# Patient Record
Sex: FEMALE | Race: WHITE | NOT HISPANIC OR LATINO | Employment: FULL TIME | URBAN - METROPOLITAN AREA
[De-identification: names, ages, dates, MRNs, and addresses within clinical notes are randomized per-mention and may not be internally consistent; named-entity substitution may affect disease eponyms.]

---

## 2021-06-09 ENCOUNTER — OFFICE VISIT (OUTPATIENT)
Dept: ORTHOPEDIC SURGERY | Facility: CLINIC | Age: 66
End: 2021-06-09

## 2021-06-09 VITALS — HEIGHT: 64 IN | TEMPERATURE: 97.1 F | WEIGHT: 290 LBS | BODY MASS INDEX: 49.51 KG/M2

## 2021-06-09 DIAGNOSIS — M25.561 RIGHT KNEE PAIN, UNSPECIFIED CHRONICITY: Primary | ICD-10-CM

## 2021-06-09 PROCEDURE — 73562 X-RAY EXAM OF KNEE 3: CPT | Performed by: ORTHOPAEDIC SURGERY

## 2021-06-09 PROCEDURE — 99202 OFFICE O/P NEW SF 15 MIN: CPT | Performed by: ORTHOPAEDIC SURGERY

## 2021-06-09 RX ORDER — LISINOPRIL 40 MG/1
TABLET ORAL
COMMUNITY
Start: 2021-06-05

## 2021-06-09 RX ORDER — HYDROCHLOROTHIAZIDE 25 MG/1
TABLET ORAL
COMMUNITY
Start: 2021-06-05

## 2021-06-09 RX ORDER — METOPROLOL SUCCINATE 25 MG/1
TABLET, EXTENDED RELEASE ORAL
COMMUNITY
Start: 2021-06-05

## 2021-06-09 RX ORDER — ASPIRIN 81 MG/1
1 TABLET ORAL DAILY
COMMUNITY

## 2021-06-10 NOTE — PROGRESS NOTES
Patient: Francine Gilliam    YOB: 1955    Medical Record Number: [id number]    Chief Complaints: Right knee pain    History of Present Illness:     65 y.o. female patient who presents for her right knee.  About 4 weeks ago something popped in the front of her knee.  She tells me that the pain kept getting worse and worse.  He got to the point where she could hardly walk.  She went to urgent care and was told that she had arthritis in the knee.  She was given oral steroids.  The knee is now doing fine.  All of her pain is resolved.  She debated canceling this appointment.    Allergies:   Allergies   Allergen Reactions   • Sulfa Antibiotics Other (See Comments)       Home Medications:      Current Outpatient Medications:   •  aspirin (aspirin) 81 MG EC tablet, Take 1 tablet by mouth Daily., Disp: , Rfl:   •  hydroCHLOROthiazide (HYDRODIURIL) 25 MG tablet, , Disp: , Rfl:   •  lisinopril (PRINIVIL,ZESTRIL) 40 MG tablet, , Disp: , Rfl:   •  metFORMIN (GLUCOPHAGE) 500 MG tablet, Take 500 mg by mouth Daily., Disp: , Rfl:   •  metoprolol succinate XL (TOPROL-XL) 25 MG 24 hr tablet, , Disp: , Rfl:     History reviewed. No pertinent past medical history.    History reviewed. No pertinent surgical history.    Social History     Occupational History   • Not on file   Tobacco Use   • Smoking status: Not on file   Substance and Sexual Activity   • Alcohol use: Not on file   • Drug use: Not on file   • Sexual activity: Not on file      Social History     Social History Narrative   • Not on file       History reviewed. No pertinent family history.    Review of Systems:      Constitutional: Denies fever, shaking or chills   Eyes: Denies change in visual acuity   HEENT: Denies nasal congestion or sore throat   Respiratory: Denies cough or shortness of breath   Cardiovascular: Denies chest pain or edema  Endocrine: Denies tremors, palpitations, intolerance of heat or cold, polyuria, polydipsia.  GI: Denies abdominal  "pain, nausea, vomiting, bloody stools or diarrhea  : Denies frequency, urgency, incontinence, retention, or nocturia.  Musculoskeletal: Denies numbness, tingling or loss of motor function except as above  Integument: Denies rash, lesion or ulceration   Neurologic: Denies headache or focal weakness, deficits  Heme: Denies spontaneous or excessive bleeding, epistaxis, hematuria, melena, fatigue, enlarged or tender lymph nodes.      All other pertinent positives and negatives as noted above in HPI.    Physical Exam: 65 y.o. female    Vitals:    06/09/21 1530   Temp: 97.1 °F (36.2 °C)   Weight: 132 kg (290 lb)   Height: 162.6 cm (64\")       General:  Patient is awake and alert.  Appears in no acute distress or discomfort.    Psych:  Affect and demeanor are appropriate.    Eyes:  Conjunctiva and sclera appear grossly normal.  Eyes track well and EOM seem to be intact.    Ears:  No gross abnormalities.  Hearing adequate for the exam.    Cardiovascular:  Regular rate and rhythm.    Lungs:  Good chest expansion.  Breathing unlabored.    Lymph:  No palpable masses or adenopathy in the affected extremity    Extremities:  Right knee:  Skin is benign.  No gross abnormalities on inspection.  No palpable masses or adenopathy.  No effusion.  No significant joint line tenderness.  Full motion.  No instability.  Negative medial and lateral Cesia's test.  Good strength with hip flexion, knee extension, ankle and toe plantarflexion and dorsiflexion.  Sensation is intact distally.  Brisk capillary refill in the toes with good skin turgor.         Radiology:   AP, merchant's and lateral views of the right knee are ordered and reviewed to evaluate her complaint.  No comparison films are immediately available.  The merchant's view is poor but it appears to me that she does have patellofemoral arthritis with osteophyte formation and subchondral sclerosis.  The tibiofemoral articulation appears fairly well-preserved.  No acute " abnormalities, lesions, masses or other concerning findings.    Assessment/Plan: Right knee pain, resolved    It looks like she probably has some patellofemoral arthritis and this may have flared up.  Her exam today is totally benign.  I told her to call me if it recurs.  Otherwise, she can follow-up as needed.    Priyank Amaya MD    06/09/2021    CC to System, Provider Not In

## 2022-03-16 ENCOUNTER — OFFICE VISIT (OUTPATIENT)
Dept: ORTHOPEDIC SURGERY | Facility: CLINIC | Age: 67
End: 2022-03-16

## 2022-03-16 ENCOUNTER — TELEPHONE (OUTPATIENT)
Dept: ORTHOPEDIC SURGERY | Facility: CLINIC | Age: 67
End: 2022-03-16

## 2022-03-16 VITALS — TEMPERATURE: 97.6 F | WEIGHT: 283 LBS | BODY MASS INDEX: 48.32 KG/M2 | HEIGHT: 64 IN

## 2022-03-16 DIAGNOSIS — M25.512 ACUTE PAIN OF LEFT SHOULDER: Primary | ICD-10-CM

## 2022-03-16 PROCEDURE — 99213 OFFICE O/P EST LOW 20 MIN: CPT | Performed by: NURSE PRACTITIONER

## 2022-03-16 PROCEDURE — 73030 X-RAY EXAM OF SHOULDER: CPT | Performed by: NURSE PRACTITIONER

## 2022-03-16 PROCEDURE — 20610 DRAIN/INJ JOINT/BURSA W/O US: CPT | Performed by: NURSE PRACTITIONER

## 2022-03-16 RX ORDER — MELOXICAM 15 MG/1
15 TABLET ORAL DAILY
COMMUNITY
Start: 2022-02-22

## 2022-03-16 RX ORDER — METHYLPREDNISOLONE ACETATE 80 MG/ML
80 INJECTION, SUSPENSION INTRA-ARTICULAR; INTRALESIONAL; INTRAMUSCULAR; SOFT TISSUE
Status: COMPLETED | OUTPATIENT
Start: 2022-03-16 | End: 2022-03-16

## 2022-03-16 RX ADMIN — METHYLPREDNISOLONE ACETATE 80 MG: 80 INJECTION, SUSPENSION INTRA-ARTICULAR; INTRALESIONAL; INTRAMUSCULAR; SOFT TISSUE at 10:31

## 2022-03-16 NOTE — PATIENT INSTRUCTIONS
Rotator Cuff Tendinitis    Rotator cuff tendinitis is inflammation of the tendons in the rotator cuff. Tendons are tough, cord-like bands that connect muscle to bone. The rotator cuff includes all of the muscles and tendons that connect the arm to the shoulder. The rotator cuff holds the head of the humerus, or the upper arm bone, in the cup of the shoulder blade (scapula).  This condition can lead to a long-term or chronic tear. The tear may be partial or complete.  What are the causes?  This condition is usually caused by overusing the rotator cuff.  What increases the risk?  This condition is more likely to develop in athletes and workers who frequently use their shoulder or reach over their heads. This can include activities such as:  Tennis.  Baseball or softball.  Swimming.  Construction work.  Painting.  What are the signs or symptoms?  Symptoms of this condition include:  Pain that spreads (radiates) from the shoulder to the upper arm.  Swelling and tenderness in front of the shoulder.  Pain when reaching, pulling, or lifting the arm above the head.  Pain when lowering the arm from above the head.  Minor pain in the shoulder when resting.  Increased pain in the shoulder at night.  Difficulty placing the arm behind the back.  How is this diagnosed?  This condition is diagnosed with a physical exam and medical history. Tests may also be done, including:  X-rays.  MRI.  Ultrasound.  CT with or without contrast.  How is this treated?  Treatment for this condition depends on the severity of the condition. In less severe cases, treatment may include:  Rest. This may be done with a sling that holds the shoulder still (immobilization). Your health care provider may also recommend avoiding activities that involve lifting your arm over your head.  Icing the shoulder.  Anti-inflammatory medicines, such as aspirin or ibuprofen.  In more severe cases, treatment may include:  Physical therapy.  Steroid  injections.  Surgery.  Follow these instructions at home:  If you have a sling:  Wear the sling as told by your health care provider. Remove it only as told by your health care provider.  Loosen it if your fingers tingle, become numb, or turn cold and blue.  Keep it clean.  If the sling is not waterproof:  Do not let it get wet.  Cover it with a watertight covering when you take a bath or shower.  Managing pain, stiffness, and swelling    If directed, put ice on the injured area. To do this:  If you have a removable sling, remove it as told by your health care provider.  Put ice in a plastic bag.  Place a towel between your skin and the bag.  Leave the ice on for 20 minutes, 2-3 times a day.  Move your fingers often to reduce stiffness and swelling.  Raise (elevate) the injured area above the level of your heart while you are lying down.  Find a comfortable sleeping position, or sleep in a recliner, if available.    Activity  Rest your shoulder as told by your health care provider.  Ask your health care provider when it is safe to drive if you have a sling on your arm.  Return to your normal activities as told by your health care provider. Ask your health care provider what activities are safe for you.  Do any exercises or stretches as told by your health care provider or physical therapist.  If you do repetitive overhead tasks, take small breaks in between and include stretching exercises as told by your health care provider.  General instructions  Do not use any products that contain nicotine or tobacco, such as cigarettes, e-cigarettes, and chewing tobacco. These can delay healing. If you need help quitting, ask your health care provider.  Take over-the-counter and prescription medicines only as told by your health care provider.  Keep all follow-up visits as told by your health care provider. This is important.  Contact a health care provider if:  Your pain gets worse.  You have new pain in your arm, hands, or  fingers.  Your pain is not relieved with medicine or does not get better after 6 weeks of treatment.  You have crackling sensations when moving your shoulder in certain directions.  You hear a snapping sound after using your shoulder, followed by severe pain and weakness.  Get help right away if:  Your arm, hand, or fingers are numb or tingling.  Your arm, hand, or fingers are swollen or painful or they turn white or blue.  Summary  Rotator cuff tendinitis is inflammation of the tendons in the rotator cuff. Tendons are tough, cord-like bands that connect muscle to bone.  This condition is usually caused by overusing the rotator cuff, which includes all of the muscles and tendons that connect the arm to the shoulder.  This condition is more likely to develop in athletes and workers who frequently use their shoulder or reach over their heads.  Treatment generally includes rest, anti-inflammatory medicines, and icing. In some cases, physical therapy and steroid injections may be needed. In severe cases, surgery may be needed.  This information is not intended to replace advice given to you by your health care provider. Make sure you discuss any questions you have with your health care provider.  Document Revised: 09/21/2020 Document Reviewed: 09/21/2020  [employer] Patient Education © 2021 [employer]

## 2022-03-16 NOTE — TELEPHONE ENCOUNTER
Mrs. Gilliam called and said she meant to ask Karen for some muscle relaxers just to get through this.

## 2022-03-16 NOTE — PROGRESS NOTES
Patient: Dilcia Gilliam    YOB: 1955    Medical Record Number: [id number]    Chief Complaints:  Left shoulder pain    History of Present Illness:     66 y.o. female patient who presents with a complaint of left shoulder pain and weakness.  She reports that the symptoms first started 4 days ago.  Reports she pushed up and rolled in bed from a prone to supine position.  Localizes the majority of her pain to the posterior shoulder.  Reports pain radiates to the scapula.  Reports her pain is increased at night and has difficult time getting into a comfortable position.  Describes her pain as severe,constant and aching.  The pain is worse with certain reaching and lifting movements.  She has tried meloxicam, Tylenol, and ice with mild, temporary relief.  She says heat actually makes her pain worse.  She denies any shooting pain down the arm, distal weakness, numbness or paresthesias.  Patient is right-hand dominant    Allergies:   Allergies   Allergen Reactions   • Sulfa Antibiotics Other (See Comments)       Home Medications:    Current Outpatient Medications:   •  aspirin 81 MG EC tablet, Take 1 tablet by mouth Daily., Disp: , Rfl:   •  hydroCHLOROthiazide (HYDRODIURIL) 25 MG tablet, , Disp: , Rfl:   •  lisinopril (PRINIVIL,ZESTRIL) 40 MG tablet, , Disp: , Rfl:   •  metFORMIN (GLUCOPHAGE) 500 MG tablet, Take 500 mg by mouth Daily., Disp: , Rfl:   •  metoprolol succinate XL (TOPROL-XL) 25 MG 24 hr tablet, , Disp: , Rfl:   •  meloxicam (MOBIC) 15 MG tablet, Take 15 mg by mouth Daily. with food, Disp: , Rfl:     History reviewed. No pertinent past medical history.    No past surgical history on file.    Social History     Occupational History   • Not on file   Tobacco Use   • Smoking status: Not on file   • Smokeless tobacco: Not on file   Substance and Sexual Activity   • Alcohol use: Not on file   • Drug use: Not on file   • Sexual activity: Not on file      Social History     Social History Narrative  "  • Not on file       History reviewed. No pertinent family history.    Review of Systems:      Constitutional: Denies fever, shaking or chills   Eyes: Denies change in visual acuity   HEENT: Denies nasal congestion or sore throat   Respiratory: Denies cough or shortness of breath   Cardiovascular: Denies chest pain or edema  Endocrine: Denies tremors, palpitations, intolerance of heat or cold, polyuria, polydipsia.  GI: Denies abdominal pain, nausea, vomiting, bloody stools or diarrhea  : Denies frequency, urgency, incontinence, retention, or nocturia.  Musculoskeletal: Denies numbness, tingling or loss of motor function except as above  Integument: Denies rash, lesion or ulceration   Neurologic: Denies headache or focal weakness, deficits  Heme: Denies spontaneous or excessive bleeding, epistaxis, hematuria, melena, fatigue, enlarged or tender lymph nodes.      All other pertinent positives and negatives as noted above in HPI.    Physical Exam:   66 y.o. female    Vitals:    03/16/22 1012   Temp: 97.6 °F (36.4 °C)   TempSrc: Temporal   Weight: 128 kg (283 lb)   Height: 162.6 cm (64\")     General:  Patient is awake and alert.  Appears in no acute distress or discomfort.    Psych:  Affect and demeanor are appropriate.    Eyes:  Conjunctiva and sclera appear grossly normal.  Eyes track well and EOM seem to be intact.    Ears:  No gross abnormalities.  Hearing adequate for the exam.    Cardiovascular:  Regular rate and rhythm.    Lungs:  Good chest expansion.  Breathing unlabored.    Lymph:  No palpable adenopathy about neck or axilla.    Neck:  Supple.  Normal ROM.  Negative Spurling's for shoulder or arm pain.    Left upper extremity:  Skin is benign.  No gross abnormalities on inspection including any atrophy, swellings, or masses.  No palpable masses or adenopathy.  No focal areas of significant tenderness.  Full shoulder motion.  No evident instability or apprehension.  Mildly positive NeerCatyKam.  Unable to " fully assess rotator cuff strength due to her discomfort level.  Good strength with internal and external rotation.  Good strength in the deltoid, biceps, triceps, and .  Intact sensation throughout the arm.  Brisk cap refill.  Palpable radial pulse.  Good skin turgor.         Radiology:   AP, scapular Y, and axillary views of the left shoulder are ordered by myself and reviewed to evaluate the patient's complaint.  No comparison films are immediately available.  The x-rays show moderate acromioclavicular joint arthritis with joint space narrowing.  He has what appears to be a type II acromion.  No acute abnormalities, lesions, masses, or other concerning findings.  The acromiohumeral interval is normal.      Assessment/Plan:   Acute left shoulder pain, suspect rotator cuff tendinitis    I suspect she is experiencing acute pain due to rotator cuff tendinitis.  It is possible she has a rotator cuff tear, but I was not able to fully evaluate her strength today.  Despite the limited assessment, we discussed the natural history of rotator cuff tears and risk of potential tear progression.  We thoroughly discussed options in detail including a trial of conservative treatment versus further work-up with MRI.  She has acknowledged understanding of this information.  The patient would prefer to try conservative options to see if her pain will improve.  She has opted for a steroid injection today.  The risks, benefits and alternatives to an injection were thoroughly discussed.  I explained the cortisone may cause an increase in her glucose levels transiently.  She verbalized understanding and consented to proceed.  The injection was performed as described below.  She will continue the meloxicam as prescribed by her PCP.  She declined my offer for physical therapy, but may consider this in the future.  She may return to work on 3/21/2022 without restrictions..  She will follow up with me in 6 weeks.     Karen Gaspar,  APRN    03/16/2022      Large Joint Arthrocentesis: L subacromial bursa  Date/Time: 3/16/2022 10:31 AM  Consent given by: patient  Site marked: site marked  Timeout: Immediately prior to procedure a time out was called to verify the correct patient, procedure, equipment, support staff and site/side marked as required   Supporting Documentation  Indications: pain   Procedure Details  Location: shoulder - L subacromial bursa  Preparation: Patient was prepped and draped in the usual sterile fashion  Needle gauge: 21g.  Approach: posterior  Medications administered: 2 mL lidocaine (cardiac); 80 mg methylPREDNISolone acetate 80 MG/ML  Patient tolerance: patient tolerated the procedure well with no immediate complications

## 2022-03-18 RX ORDER — CYCLOBENZAPRINE HCL 10 MG
10 TABLET ORAL 3 TIMES DAILY PRN
Qty: 30 TABLET | Refills: 0 | Status: SHIPPED | OUTPATIENT
Start: 2022-03-18

## 2022-03-18 NOTE — TELEPHONE ENCOUNTER
I spoke to MsSrikanth Mane.  Reports she is having some muscle spasms in the shoulder.  I have given her a prescription for Flexeril.  The risks were discussed.  She will keep her previously scheduled follow-up appointment with me.

## 2022-03-23 ENCOUNTER — TELEPHONE (OUTPATIENT)
Dept: ORTHOPEDIC SURGERY | Facility: CLINIC | Age: 67
End: 2022-03-23

## 2022-03-23 DIAGNOSIS — M25.512 ACUTE PAIN OF LEFT SHOULDER: Primary | ICD-10-CM

## 2022-03-23 NOTE — TELEPHONE ENCOUNTER
MRI ORDER PLACED AND PT INFORMED. I GAVE HER [facility] SCHEDULING NUMBER AND INSTRUCTED HER TO CALL THEM IF SHE HASNT HEARD FROM THEM BY NEXT WEEK

## 2022-03-23 NOTE — TELEPHONE ENCOUNTER
Provider: CLIFTON  Caller: TYLER HASSAN  Relationship to Patient: PATIENT  Pharmacy: N/A  Phone Number: [phone]  Reason for Call: PATIENT HAD NO RELIEF FROM [facility] INJECTION AND IS STILL HAVING PAIN. PATIENT WANTS TO SEE DR CLIFTON WHITEHEAD  When was the patient last seen: 3.16.22

## 2022-03-23 NOTE — TELEPHONE ENCOUNTER
Please refer her for an MRI.  Tell her that I will call her as soon as I see the results.  Thanks.

## 2022-04-06 ENCOUNTER — APPOINTMENT (OUTPATIENT)
Dept: MRI IMAGING | Facility: HOSPITAL | Age: 67
End: 2022-04-06

## 2022-04-19 ENCOUNTER — APPOINTMENT (OUTPATIENT)
Dept: MRI IMAGING | Facility: HOSPITAL | Age: 67
End: 2022-04-19

## 2022-04-19 ENCOUNTER — HOSPITAL ENCOUNTER (OUTPATIENT)
Dept: GENERAL RADIOLOGY | Facility: HOSPITAL | Age: 67
End: 2022-04-19